# Patient Record
Sex: MALE
[De-identification: names, ages, dates, MRNs, and addresses within clinical notes are randomized per-mention and may not be internally consistent; named-entity substitution may affect disease eponyms.]

---

## 2019-01-23 ENCOUNTER — OFFICE VISIT (OUTPATIENT)
Dept: OTOLARYNGOLOGY | Facility: CLINIC | Age: 54
End: 2019-01-23
Payer: COMMERCIAL

## 2019-01-23 DIAGNOSIS — J34.89 NASAL OBSTRUCTION: ICD-10-CM

## 2019-01-23 DIAGNOSIS — J34.2 DEVIATED NASAL SEPTUM: Primary | ICD-10-CM

## 2019-01-23 RX ORDER — TAMSULOSIN HYDROCHLORIDE 0.4 MG/1
0.4 CAPSULE ORAL DAILY
COMMUNITY

## 2019-01-23 RX ORDER — SILDENAFIL 25 MG/1
25 TABLET, FILM COATED ORAL PRN
COMMUNITY

## 2019-01-23 RX ORDER — VALACYCLOVIR HYDROCHLORIDE 500 MG/1
500 TABLET, FILM COATED ORAL PRN
COMMUNITY

## 2019-01-23 NOTE — NURSING NOTE
Chief Complaint   Patient presents with     Consult     deviated septum     Obtained photo documentation.  Provided patient with a cosmetic quote in addition to his septoplasty.  We reid submit the septoplasty to his insurance to see if they will cover it and if not, we will provide a new quote for both the septoplasty as well as the cosmetic portion.   Wendy Robledo, Patient Coordinator

## 2019-01-23 NOTE — LETTER
Date:April 12, 2019      Patient was self referred, no letter generated. Do not send.        [facility] Health Information

## 2019-01-23 NOTE — LETTER
1/23/2019       RE: Lane Lewis  [address] [address]     Dear Colleague,    Thank you for referring your patient, Lane Lewis, to the [facility]. Please see a copy of my visit note below.    This office note has been dictated.     Again, thank you for allowing me to participate in the care of your patient.      Sincerely,    MICKI FERMIN MD

## 2019-01-24 NOTE — PROGRESS NOTES
Service Date: 2019      HISTORY OF PRESENT ILLNESS:  Mr. Gutierrez is in and has recurrent left-sided epistaxis and nasal congestion.  He does snore some at night.  The bleeding is a bigger concern in the wintertime.  He is happy with the facelift we provided him.  He does take supplements, and we talked about the importance of being off those.      PHYSICAL EXAMINATION:  Exam shows nice definition of the cervical-mental angle.  Midface suspension is improved.  He is somewhat volume depleted.  He has a significant left septal deformity anteriorly, and over the apex of the excrescence or convexity the mucosa is abraded.  One can see that there has been recent bleeding there.  It restricts the anterior valve area by 75%.  He would benefit from a septoplasty, and his turbinates are not enlarged.  Mucosa is healthy other than the anterior septum.  There are no polyps or no other debris.  He does not have allergies, and therefore medications are of little value or in the intent to benefit.  He also notes a convexity in the dorsum.  I talked about reducing this slightly and increasing the tip projection.  That would be done with an external approach.  He will reflect on what he wants to do, but septoplasty can improve his breathing, can decrease his bleeding I think with reasonable efficacy, and I am not certain it would make a significant difference in his snoring.  Risks and benefits of surgery were discussed.  If he wants to proceed, we would be happy to help him.      Twenty-five minutes were spent in consultation with face-to-face contact.         MICKI FERMIN MD             D: 2019   T: 2019   MT: FRANK      Name:     OSCAR GUTIERREZ   MRN:      [id number]        Account:      [id number]   :      1965           Service Date: 2019      Document: [id number]

## 2019-01-28 DIAGNOSIS — J34.2 DEVIATED NASAL SEPTUM: ICD-10-CM

## 2019-01-28 DIAGNOSIS — J34.89 NASAL OBSTRUCTION: Primary | ICD-10-CM

## 2019-01-28 RX ORDER — CEFAZOLIN SODIUM 2 G/50ML
2 SOLUTION INTRAVENOUS
Status: CANCELLED | OUTPATIENT
Start: 2019-01-28

## 2019-01-28 RX ORDER — CEFAZOLIN SODIUM 1 G/50ML
1 INJECTION, SOLUTION INTRAVENOUS SEE ADMIN INSTRUCTIONS
Status: CANCELLED | OUTPATIENT
Start: 2019-01-28

## 2019-02-03 ENCOUNTER — DOCUMENTATION ONLY (OUTPATIENT)
Dept: OTOLARYNGOLOGY | Facility: CLINIC | Age: 54
End: 2019-02-03

## 2019-02-04 NOTE — PROGRESS NOTES
During the office consultation 25 minutes were spent in consultation and more than 50% of the time was spent in counseling and education'  MICKI DARDEN      Physical Exam

## 2019-02-20 ENCOUNTER — TRANSFERRED RECORDS (OUTPATIENT)
Dept: HEALTH INFORMATION MANAGEMENT | Facility: CLINIC | Age: 54
End: 2019-02-20

## 2019-03-06 ENCOUNTER — TRANSFERRED RECORDS (OUTPATIENT)
Dept: HEALTH INFORMATION MANAGEMENT | Facility: CLINIC | Age: 54
End: 2019-03-06

## 2019-03-11 ENCOUNTER — OFFICE VISIT (OUTPATIENT)
Dept: PLASTIC SURGERY | Facility: CLINIC | Age: 54
End: 2019-03-11
Payer: COMMERCIAL

## 2019-03-11 DIAGNOSIS — Z98.890 POSTOPERATIVE STATE: Primary | ICD-10-CM

## 2019-03-11 NOTE — LETTER
March 11, 2019  Re: Lane Lewis  1965    Dear Dr. Peoples,    Thank you so much for referring Lane Lewis to the [facility]. I had the pleasure of visiting with Lane today.     Attached you will find a copy of my note. Please feel free to reach out to me with any questions, [phone].     This office note has been dictated.       Your trust in our practice and care is much appreciated.    Sincerely,  MICKI FERMIN MD

## 2019-03-11 NOTE — LETTER
Date:March 12, 2019      Patient was self referred, no letter generated. Do not send.        [facility] Health Information

## 2019-03-12 NOTE — PROGRESS NOTES
Service Date: 2019      Mr. Gutierrez is in today.  The stents are removed.  His airway is terrific.  He is delighted.  The contour of the nose is excellent.  The nose is straight.  The profile is really very pleasing.  I will see him in about a month or so.  He is now safe to fly.  I told him if he is to fly, then I would have him use some Afrin in advance of ascent and descent.         MICKI FERMIN MD             D: 2019   T: 2019   MT: dejon      Name:     OSCAR GUTIERREZ   MRN:      [id number]        Account:      [id number]   :      1965           Service Date: 2019      Document: [id number]

## 2019-03-12 NOTE — NURSING NOTE
Chief Complaint   Patient presents with     Post-op Visit     septorhinoplasty     Patient will follow up with Dr. Barnett in 2-3 months. Wendy Robledo, Patient Coordinator

## 2019-03-15 ENCOUNTER — TELEPHONE (OUTPATIENT)
Dept: PLASTIC SURGERY | Facility: CLINIC | Age: 54
End: 2019-03-15

## 2019-03-15 DIAGNOSIS — T81.41XA INFECTION OF SUPERFICIAL INCISIONAL SURGICAL SITE AFTER PROCEDURE, INITIAL ENCOUNTER: Primary | ICD-10-CM

## 2019-03-15 RX ORDER — CEPHALEXIN 500 MG/1
500 CAPSULE ORAL 2 TIMES DAILY
Qty: 14 CAPSULE | Refills: 0 | Status: SHIPPED | OUTPATIENT
Start: 2019-03-15 | End: 2019-03-22

## 2019-03-15 RX ORDER — CEPHALEXIN 500 MG/1
500 CAPSULE ORAL 2 TIMES DAILY
Qty: 14 CAPSULE | Refills: 0 | Status: SHIPPED | OUTPATIENT
Start: 2019-03-15 | End: 2019-03-15

## 2019-03-15 NOTE — TELEPHONE ENCOUNTER
I spoke with Lane Lewis today. He is s/p septorhinoplasty on 3/6. He is concerned about some increased redness and swelling of his columella and anterior septum. He sent be photos of his nose and it does look more red than prior.  A prescription for keflex was sent to his pharmacy and he has resumed using bactroban.

## 2019-03-18 ENCOUNTER — OFFICE VISIT (OUTPATIENT)
Dept: PLASTIC SURGERY | Facility: CLINIC | Age: 54
End: 2019-03-18

## 2019-03-18 DIAGNOSIS — Z98.890 POSTOPERATIVE STATE: ICD-10-CM

## 2019-03-18 DIAGNOSIS — J34.89 INFECTION OF NOSE: Primary | ICD-10-CM

## 2019-03-18 RX ORDER — SULFAMETHOXAZOLE/TRIMETHOPRIM 800-160 MG
1 TABLET ORAL 2 TIMES DAILY
Qty: 14 TABLET | Refills: 0 | Status: SHIPPED | OUTPATIENT
Start: 2019-03-18 | End: 2019-03-28

## 2019-03-18 RX ORDER — MUPIROCIN 20 MG/G
OINTMENT TOPICAL
Qty: 15 G | Refills: 0 | Status: SHIPPED | OUTPATIENT
Start: 2019-03-18

## 2019-03-18 NOTE — LETTER
Date:March 19, 2019      Patient was self referred, no letter generated. Do not send.        [facility] Health Information

## 2019-03-18 NOTE — PROGRESS NOTES
Lane Lewis was in today.  He is 12 days s/p SRP.  He was started on keflex a two days ago due to increasing redness along his columella.  He reported that yesterday the redness was decreased but he soaked his nose and following this, some scabs came off and some light pink tinged purulent material came out along the midline of his columellar incision and along the left side of it as well.  His pain was improved following this.  Given this, I asked him to come to the office to be seen today.  On exam, the nasal swelling and erythema is significantly improved, though he continues to have some erythema and swelling along his anterior septum and columella.  I was unable to palpate any areas of fluctuance and was unable to express any purulence.  The rest of his nose is healing well and airway is widely patent. I sent a new prescription for bactroban to his pharmacy and instructed him to continue the keflex.  He is leaving the country on Wednesday for two weeks.  Given this, I also sent a prescription for bactrim to his pharmacy with instructions to only take this if symptoms worsen or if he continues to have signs of infection while he is away and he has run out of the keflex.  He will schedule an appointment to see Dr. Barnett when he is back from his trip.

## 2019-03-18 NOTE — LETTER
3/18/2019       RE: Lane Lewis  [address] [address]     Dear Colleague,    Thank you for referring your patient, Lane Lewis, to the [facility] at [facility]. Please see a copy of my visit note below.    Lane Lewis was in today.  He is 12 days s/p SRP.  He was started on keflex a two days ago due to increasing redness along his columella.  He reported that yesterday the redness was decreased but he soaked his nose and following this, some scabs came off and some light pink tinged purulent material came out along the midline of his columellar incision and along the left side of it as well.  His pain was improved following this.  Given this, I asked him to come to the office to be seen today.  On exam, the nasal swelling and erythema is significantly improved, though he continues to have some erythema and swelling along his anterior septum and columella.  I was unable to palpate any areas of fluctuance and was unable to express any purulence.  The rest of his nose is healing well and airway is widely patent. I sent a new prescription for bactroban to his pharmacy and instructed him to continue the keflex.  He is leaving the country on Wednesday for two weeks.  Given this, I also sent a prescription for bactrim to his pharmacy with instructions to only take this if symptoms worsen or if he continues to have signs of infection while he is away and he has run out of the keflex.  He will schedule an appointment to see Dr. Barnett when he is back from his trip.    Again, thank you for allowing me to participate in the care of your patient.      Sincerely,    Celi Garcia MD

## 2019-03-18 NOTE — LETTER
March 18, 2019  Re: Lane Lewis  1965    Dear Dr. Breaux ref. provider found,    Thank you so much for referring Lane Lewis to the [facility]. I had the pleasure of visiting with Lane today.     Attached you will find a copy of my note. Please feel free to reach out to me with any questions, [phone].     Lane Lewis was in today.  He is 12 days s/p SRP.  He was started on keflex a two days ago due to increasing redness along his columella.  He reported that yesterday the redness was decreased but he soaked his nose and following this, some scabs came off and some light pink tinged purulent material came out along the midline of his columellar incision and along the left side of it as well.  His pain was improved following this.  Given this, I asked him to come to the office to be seen today.  On exam, the nasal swelling and erythema is significantly improved, though he continues to have some erythema and swelling along his anterior septum and columella.  I was unable to palpate any areas of fluctuance and was unable to express any purulence.  The rest of his nose is healing well and airway is widely patent. I sent a new prescription for bactroban to his pharmacy and instructed him to continue the keflex.  He is leaving the country on Wednesday for two weeks.  Given this, I also sent a prescription for bactrim to his pharmacy with instructions to only take this if symptoms worsen or if he continues to have signs of infection while he is away and he has run out of the keflex.  He will schedule an appointment to see Dr. Barnett when he is back from his trip.      Your trust in our practice and care is much appreciated.    Sincerely,  Celi Garcia MD

## 2019-03-18 NOTE — LETTER
Date:March 19, 2019      Patient was self referred, no letter generated. Do not send.        [facility] Health Information

## 2019-04-24 ENCOUNTER — OFFICE VISIT (OUTPATIENT)
Dept: PLASTIC SURGERY | Facility: CLINIC | Age: 54
End: 2019-04-24
Payer: COMMERCIAL

## 2019-04-24 DIAGNOSIS — Z98.890 POSTOPERATIVE STATE: Primary | ICD-10-CM

## 2019-04-24 NOTE — LETTER
Date:April 25, 2019      Patient was self referred, no letter generated. Do not send.        [facility] Health Information

## 2019-04-24 NOTE — LETTER
4/24/2019       RE: Lane Lewis  [address] [address]     Dear Colleague,    Thank you for referring your patient, Lane Lewis, to the [facility] at [facility]. Please see a copy of my visit note below.    This office note has been dictated.     Again, thank you for allowing me to participate in the care of your patient.      Sincerely,    MICKI FERMIN MD

## 2019-04-24 NOTE — LETTER
Date:April 25, 2019      Patient was self referred, no letter generated. Do not send.        [facility] Health Information

## 2019-04-24 NOTE — LETTER
April 24, 2019  Re: Lane Lewis  1965    Dear Dr. Peoples,    Thank you so much for referring Lane Lewis to the [facility]. I had the pleasure of visiting with Lane today.     Attached you will find a copy of my note. Please feel free to reach out to me with any questions, [phone].     This office note has been dictated.       Your trust in our practice and care is much appreciated.    Sincerely,  MICKI FERMIN MD

## 2019-04-24 NOTE — NURSING NOTE
Chief Complaint   Patient presents with     Post-op Visit     septorhinoplasty     Obtained photo documentation.  Patient happy with results, breathing better and likes the slight cosmetic change, but still getting used to it.  He will follow up with Dr. Barnett in 6 months.   Wendy Robledo, Patient Coordinator

## 2019-04-25 NOTE — PROGRESS NOTES
Service Date: 2019      Mr. Gutierrez is in today.  His nose looks good and he breathes well.  He is a little bit of fullness on the left side of the columella, which creates some fullness and we will look toward a time to monitor this in some months.  If it does well, he can defer his followup with us.  Photos were taken today.  He can wear his glasses, but I would encourage him to wear them higher up on the bridge so as not to pinch the upper lateral cartilages in the early phases.  He can defer the appointment in 6 months if all is doing well.         MICKI FERMIN MD             D: 2019   T: 2019   MT: al      Name:     OSCAR GUTIERREZ   MRN:      [id number]        Account:      [id number]   :      1965           Service Date: 2019      Document: [id number]